# Patient Record
Sex: MALE | Race: WHITE | Employment: OTHER | URBAN - METROPOLITAN AREA
[De-identification: names, ages, dates, MRNs, and addresses within clinical notes are randomized per-mention and may not be internally consistent; named-entity substitution may affect disease eponyms.]

---

## 2022-11-08 ENCOUNTER — OFFICE VISIT (OUTPATIENT)
Dept: GASTROENTEROLOGY | Age: 47
End: 2022-11-08
Payer: MEDICARE

## 2022-11-08 VITALS
DIASTOLIC BLOOD PRESSURE: 80 MMHG | OXYGEN SATURATION: 97 % | SYSTOLIC BLOOD PRESSURE: 120 MMHG | WEIGHT: 287.4 LBS | TEMPERATURE: 98.3 F | HEART RATE: 70 BPM | BODY MASS INDEX: 38.09 KG/M2 | RESPIRATION RATE: 14 BRPM | HEIGHT: 73 IN

## 2022-11-08 DIAGNOSIS — R11.2 NAUSEA AND VOMITING, UNSPECIFIED VOMITING TYPE: ICD-10-CM

## 2022-11-08 DIAGNOSIS — R10.13 EPIGASTRIC PAIN: Primary | ICD-10-CM

## 2022-11-08 DIAGNOSIS — Z98.84 STATUS POST BARIATRIC SURGERY: ICD-10-CM

## 2022-11-08 DIAGNOSIS — Z12.11 COLON CANCER SCREENING: ICD-10-CM

## 2022-11-08 PROBLEM — I10 HTN (HYPERTENSION): Status: ACTIVE | Noted: 2022-11-08

## 2022-11-08 PROBLEM — R11.10 VOMITING: Status: ACTIVE | Noted: 2022-11-08

## 2022-11-08 PROBLEM — R10.9 ABDOMINAL PAIN: Status: ACTIVE | Noted: 2022-11-08

## 2022-11-08 PROCEDURE — G8510 SCR DEP NEG, NO PLAN REQD: HCPCS | Performed by: INTERNAL MEDICINE

## 2022-11-08 PROCEDURE — G8417 CALC BMI ABV UP PARAM F/U: HCPCS | Performed by: INTERNAL MEDICINE

## 2022-11-08 PROCEDURE — 3079F DIAST BP 80-89 MM HG: CPT | Performed by: INTERNAL MEDICINE

## 2022-11-08 PROCEDURE — 3074F SYST BP LT 130 MM HG: CPT | Performed by: INTERNAL MEDICINE

## 2022-11-08 PROCEDURE — 99204 OFFICE O/P NEW MOD 45 MIN: CPT | Performed by: INTERNAL MEDICINE

## 2022-11-08 PROCEDURE — G8427 DOCREV CUR MEDS BY ELIG CLIN: HCPCS | Performed by: INTERNAL MEDICINE

## 2022-11-08 RX ORDER — ERGOCALCIFEROL 1.25 MG/1
CAPSULE ORAL
COMMUNITY
Start: 2022-10-20

## 2022-11-08 RX ORDER — ROSUVASTATIN CALCIUM 10 MG/1
TABLET, COATED ORAL
COMMUNITY
Start: 2022-03-28

## 2022-11-08 RX ORDER — PANTOPRAZOLE SODIUM 40 MG/1
TABLET, DELAYED RELEASE ORAL
COMMUNITY
Start: 2022-09-26

## 2022-11-08 RX ORDER — OXYCODONE HYDROCHLORIDE 10 MG/1
TABLET ORAL
COMMUNITY
Start: 2022-10-17

## 2022-11-08 RX ORDER — SERTRALINE HYDROCHLORIDE 100 MG/1
TABLET, FILM COATED ORAL
COMMUNITY
Start: 2022-03-14

## 2022-11-08 RX ORDER — POLYETHYLENE GLYCOL 3350 17 G/17G
POWDER, FOR SOLUTION ORAL
Qty: 510 G | Refills: 0 | Status: SHIPPED | OUTPATIENT
Start: 2022-11-08 | End: 2022-11-30

## 2022-11-08 RX ORDER — BUDESONIDE AND FORMOTEROL FUMARATE DIHYDRATE 160; 4.5 UG/1; UG/1
AEROSOL RESPIRATORY (INHALATION)
COMMUNITY

## 2022-11-08 RX ORDER — BUPROPION HYDROCHLORIDE 150 MG/1
TABLET ORAL
COMMUNITY
Start: 2022-03-17

## 2022-11-08 RX ORDER — IBUPROFEN 600 MG/1
TABLET ORAL
COMMUNITY
Start: 2022-09-25

## 2022-11-08 RX ORDER — LOSARTAN POTASSIUM 100 MG/1
TABLET ORAL
COMMUNITY
Start: 2022-10-20

## 2022-11-08 RX ORDER — BUSPIRONE HYDROCHLORIDE 10 MG/1
TABLET ORAL
COMMUNITY
Start: 2022-10-20 | End: 2022-11-30

## 2022-11-08 RX ORDER — POTASSIUM CHLORIDE 750 MG/1
10 CAPSULE, EXTENDED RELEASE ORAL 2 TIMES DAILY
COMMUNITY
Start: 2022-10-20

## 2022-11-08 RX ORDER — ALBUTEROL SULFATE 90 UG/1
AEROSOL, METERED RESPIRATORY (INHALATION)
COMMUNITY

## 2022-11-08 RX ORDER — SPIRONOLACTONE 25 MG/1
25 TABLET ORAL DAILY
COMMUNITY
Start: 2022-10-20

## 2022-11-08 RX ORDER — NALOXONE HYDROCHLORIDE 4 MG/.1ML
SPRAY NASAL
COMMUNITY
Start: 2022-06-22

## 2022-11-08 RX ORDER — LUBIPROSTONE 24 UG/1
24 CAPSULE ORAL
COMMUNITY

## 2022-11-08 RX ORDER — METOPROLOL TARTRATE 25 MG/1
TABLET, FILM COATED ORAL
COMMUNITY
Start: 2022-09-25

## 2022-11-08 NOTE — H&P (VIEW-ONLY)
Shmuel Hassan is a 48 y.o. male who presents today for the following:  Chief Complaint   Patient presents with    Abdominal Pain    Vomiting     Hx of gastric bypass         Allergies   Allergen Reactions    Gabapentin Other (comments)     Other reaction(s): SEVERE STOMACH PAINS  Other reaction(s): SEVERE STOMACH PAINS      Morphine Other (comments)     Other reaction(s): PT STATES, \"MAKES ME GO CRAZY\"       Current Outpatient Medications   Medication Sig    busPIRone (BUSPAR) 10 mg tablet TAKE ONE TABLET BY MOUTH THREE TIMES DAILY AS NEEDED    Vitamin D2 1,250 mcg (50,000 unit) capsule TAKE ONE CAPSULE BY MOUTH EVERY WEEK    ibuprofen (MOTRIN) 600 mg tablet TAKE ONE TABLET THREE TIMES DAILY AS NEEDED WITH FOOD    losartan (COZAAR) 100 mg tablet take 1/2 tablet BY MOUTH TWICE DAILY    metoprolol tartrate (LOPRESSOR) 25 mg tablet take 1/2 tablet BY MOUTH TWICE DAILY    oxyCODONE IR (ROXICODONE) 10 mg tab immediate release tablet TAKE ONE TABLET BY MOUTH EVERY 6 HOURS    potassium chloride SA (MICRO-K) 10 mEq capsule Take 10 mEq by mouth two (2) times a day. spironolactone (ALDACTONE) 25 mg tablet Take 25 mg by mouth daily. albuterol (PROVENTIL HFA, VENTOLIN HFA, PROAIR HFA) 90 mcg/actuation inhaler ProAir HFA 90 mcg/actuation aerosol inhaler   Inhale 2 puffs every day by inhalation route as needed for 30 days. budesonide-formoteroL (SYMBICORT) 160-4.5 mcg/actuation HFAA Symbicort 160 mcg-4.5 mcg/actuation HFA aerosol inhaler    buPROPion XL (WELLBUTRIN XL) 150 mg tablet bupropion HCl  mg 24 hr tablet, extended release    naloxone (NARCAN) 4 mg/actuation nasal spray Narcan 4 mg/actuation nasal spray   USE AS NEEDED FOR OPIOID EMERGENCY    lubiPROStone (Amitiza) 24 mcg capsule Take 24 mcg by mouth.     rosuvastatin (CRESTOR) 10 mg tablet rosuvastatin 10 mg tablet    pantoprazole (PROTONIX) 40 mg tablet pantoprazole 40 mg tablet,delayed release    sertraline (ZOLOFT) 100 mg tablet sertraline 100 mg tablet   Take 1 tablet every day by oral route. polyethylene glycol (MIRALAX) 17 gram/dose powder Use as directed  Indications: emptying of the bowel     No current facility-administered medications for this visit. Past Medical History:   Diagnosis Date    Abdominal pain 11/8/2022    HTN (hypertension) 11/8/2022    Vomiting 11/8/2022    Hx of Gastric Bypass in 2011       Past Surgical History:   Procedure Laterality Date    HX GASTRIC BYPASS  2008    [facility] DR Sammi Kirby 57    HX ORTHOPAEDIC Left     has had 9 knee surgeries, 3 were knee replacements. HX ORTHOPAEDIC Left     carpal tunnel surgery       Family History   Problem Relation Age of Onset    Diabetes Father     Prostate Cancer Father     Heart Disease Other     Hypertension Other        Social History     Socioeconomic History    Marital status:      Spouse name: Not on file    Number of children: Not on file    Years of education: Not on file    Highest education level: Not on file   Occupational History    Not on file   Tobacco Use    Smoking status: Never    Smokeless tobacco: Never   Vaping Use    Vaping Use: Never used   Substance and Sexual Activity    Alcohol use: Never    Drug use: Never    Sexual activity: Not on file   Other Topics Concern    Not on file   Social History Narrative    Not on file     Social Determinants of Health     Financial Resource Strain: Not on file   Food Insecurity: Not on file   Transportation Needs: Not on file   Physical Activity: Not on file   Stress: Not on file   Social Connections: Not on file   Intimate Partner Violence: Not on file   Housing Stability: Not on file         57-year-old male with history of hypertension, hyperlipidemia, obesity, status post gastric bypass surgery, and gastroesophageal reflux disease who comes in for abdominal pain.   States he had gastric bypass surgery in April 2008 he states he has pain in the epigastric region every day and is been present for years. He has a lot of nausea and vomiting. States he tolerates snacks well but not regular food. He gets full few bites of a meal.  He states his bowel movements are regular and formed. He does take pantoprazole and Tums for the symptoms. He did have an EGD as ago which was okay. That test was done in [place]. He states he tolerates liquids well. Vomiting   Associated symptoms include abdominal pain. Pertinent negatives include no diarrhea. Review of Systems   Constitutional: Negative. HENT: Negative. Negative for nosebleeds. Eyes: Negative. Respiratory: Negative. Cardiovascular: Negative. Gastrointestinal:  Positive for abdominal pain, heartburn, nausea and vomiting. Negative for blood in stool, constipation, diarrhea and melena. Genitourinary: Negative. Musculoskeletal:  Positive for joint pain. Skin: Negative. Neurological: Negative. Endo/Heme/Allergies: Negative. Psychiatric/Behavioral: Negative. All other systems reviewed and are negative. Visit Vitals  /80 (BP 1 Location: Left upper arm, BP Patient Position: Sitting, BP Cuff Size: Large adult)   Pulse 70   Temp 98.3 °F (36.8 °C) (Temporal)   Resp 14   Ht 6' 1\" (1.854 m)   Wt 130.4 kg (287 lb 6.4 oz)   SpO2 97% Comment: room air   BMI 37.92 kg/m²     Physical Exam  Vitals and nursing note reviewed. Constitutional:       Appearance: Normal appearance. He is obese. HENT:      Head: Normocephalic and atraumatic. Nose: Nose normal.      Mouth/Throat:      Mouth: Mucous membranes are moist.      Pharynx: Oropharynx is clear. Eyes:      General: No scleral icterus. Extraocular Movements: Extraocular movements intact. Conjunctiva/sclera: Conjunctivae normal.      Pupils: Pupils are equal, round, and reactive to light. Cardiovascular:      Rate and Rhythm: Normal rate and regular rhythm. Heart sounds: Normal heart sounds.    Pulmonary:      Effort: Pulmonary effort is normal.      Breath sounds: Normal breath sounds. Abdominal:      General: Bowel sounds are normal. There is no distension. Palpations: Abdomen is soft. There is no mass. Tenderness: There is abdominal tenderness. There is guarding. There is no right CVA tenderness, left CVA tenderness or rebound. Hernia: No hernia is present. Musculoskeletal:         General: Normal range of motion. Cervical back: Normal range of motion and neck supple. Skin:     General: Skin is warm and dry. Coloration: Skin is not jaundiced. Neurological:      General: No focal deficit present. Mental Status: He is alert and oriented to person, place, and time. Psychiatric:         Mood and Affect: Mood normal.         Behavior: Behavior normal.         Thought Content: Thought content normal.         Judgment: Judgment normal.          1. Epigastric pain  Possibly secondary to peptic ulcer disease versus gastritis  - UPPER GI ENDOSCOPY,DIAGNOSIS; Future    2. Nausea and vomiting, unspecified vomiting type  Consider possibility of a stricture at the anastomosis  - UPPER GI ENDOSCOPY,DIAGNOSIS; Future    3. Colon cancer screening  We will schedule patient for a screening colonoscopy  - COLONOSCOPY,DIAGNOSTIC; Future  - polyethylene glycol (MIRALAX) 17 gram/dose powder; Use as directed  Indications: emptying of the bowel  Dispense: 510 g; Refill: 0    4.  Status post bariatric surgery

## 2022-11-08 NOTE — PROGRESS NOTES
Jess Epps is a 48 y.o. male who presents today for the following:  Chief Complaint   Patient presents with    Abdominal Pain    Vomiting     Hx of gastric bypass         Allergies   Allergen Reactions    Gabapentin Other (comments)     Other reaction(s): SEVERE STOMACH PAINS  Other reaction(s): SEVERE STOMACH PAINS      Morphine Other (comments)     Other reaction(s): PT STATES, \"MAKES ME GO CRAZY\"       Current Outpatient Medications   Medication Sig    busPIRone (BUSPAR) 10 mg tablet TAKE ONE TABLET BY MOUTH THREE TIMES DAILY AS NEEDED    Vitamin D2 1,250 mcg (50,000 unit) capsule TAKE ONE CAPSULE BY MOUTH EVERY WEEK    ibuprofen (MOTRIN) 600 mg tablet TAKE ONE TABLET THREE TIMES DAILY AS NEEDED WITH FOOD    losartan (COZAAR) 100 mg tablet take 1/2 tablet BY MOUTH TWICE DAILY    metoprolol tartrate (LOPRESSOR) 25 mg tablet take 1/2 tablet BY MOUTH TWICE DAILY    oxyCODONE IR (ROXICODONE) 10 mg tab immediate release tablet TAKE ONE TABLET BY MOUTH EVERY 6 HOURS    potassium chloride SA (MICRO-K) 10 mEq capsule Take 10 mEq by mouth two (2) times a day. spironolactone (ALDACTONE) 25 mg tablet Take 25 mg by mouth daily. albuterol (PROVENTIL HFA, VENTOLIN HFA, PROAIR HFA) 90 mcg/actuation inhaler ProAir HFA 90 mcg/actuation aerosol inhaler   Inhale 2 puffs every day by inhalation route as needed for 30 days. budesonide-formoteroL (SYMBICORT) 160-4.5 mcg/actuation HFAA Symbicort 160 mcg-4.5 mcg/actuation HFA aerosol inhaler    buPROPion XL (WELLBUTRIN XL) 150 mg tablet bupropion HCl  mg 24 hr tablet, extended release    naloxone (NARCAN) 4 mg/actuation nasal spray Narcan 4 mg/actuation nasal spray   USE AS NEEDED FOR OPIOID EMERGENCY    lubiPROStone (Amitiza) 24 mcg capsule Take 24 mcg by mouth.     rosuvastatin (CRESTOR) 10 mg tablet rosuvastatin 10 mg tablet    pantoprazole (PROTONIX) 40 mg tablet pantoprazole 40 mg tablet,delayed release    sertraline (ZOLOFT) 100 mg tablet sertraline 100 mg tablet   Take 1 tablet every day by oral route. polyethylene glycol (MIRALAX) 17 gram/dose powder Use as directed  Indications: emptying of the bowel     No current facility-administered medications for this visit. Past Medical History:   Diagnosis Date    Abdominal pain 11/8/2022    HTN (hypertension) 11/8/2022    Vomiting 11/8/2022    Hx of Gastric Bypass in 2011       Past Surgical History:   Procedure Laterality Date    HX GASTRIC BYPASS  2008    [facility] DR Sammi Kirby 57    HX ORTHOPAEDIC Left     has had 9 knee surgeries, 3 were knee replacements. HX ORTHOPAEDIC Left     carpal tunnel surgery       Family History   Problem Relation Age of Onset    Diabetes Father     Prostate Cancer Father     Heart Disease Other     Hypertension Other        Social History     Socioeconomic History    Marital status:      Spouse name: Not on file    Number of children: Not on file    Years of education: Not on file    Highest education level: Not on file   Occupational History    Not on file   Tobacco Use    Smoking status: Never    Smokeless tobacco: Never   Vaping Use    Vaping Use: Never used   Substance and Sexual Activity    Alcohol use: Never    Drug use: Never    Sexual activity: Not on file   Other Topics Concern    Not on file   Social History Narrative    Not on file     Social Determinants of Health     Financial Resource Strain: Not on file   Food Insecurity: Not on file   Transportation Needs: Not on file   Physical Activity: Not on file   Stress: Not on file   Social Connections: Not on file   Intimate Partner Violence: Not on file   Housing Stability: Not on file         25-year-old male with history of hypertension, hyperlipidemia, obesity, status post gastric bypass surgery, and gastroesophageal reflux disease who comes in for abdominal pain.   States he had gastric bypass surgery in April 2008 he states he has pain in the epigastric region every day and is been present for years. He has a lot of nausea and vomiting. States he tolerates snacks well but not regular food. He gets full few bites of a meal.  He states his bowel movements are regular and formed. He does take pantoprazole and Tums for the symptoms. He did have an EGD as ago which was okay. That test was done in [place]. He states he tolerates liquids well. Vomiting   Associated symptoms include abdominal pain. Pertinent negatives include no diarrhea. Review of Systems   Constitutional: Negative. HENT: Negative. Negative for nosebleeds. Eyes: Negative. Respiratory: Negative. Cardiovascular: Negative. Gastrointestinal:  Positive for abdominal pain, heartburn, nausea and vomiting. Negative for blood in stool, constipation, diarrhea and melena. Genitourinary: Negative. Musculoskeletal:  Positive for joint pain. Skin: Negative. Neurological: Negative. Endo/Heme/Allergies: Negative. Psychiatric/Behavioral: Negative. All other systems reviewed and are negative. Visit Vitals  /80 (BP 1 Location: Left upper arm, BP Patient Position: Sitting, BP Cuff Size: Large adult)   Pulse 70   Temp 98.3 °F (36.8 °C) (Temporal)   Resp 14   Ht 6' 1\" (1.854 m)   Wt 130.4 kg (287 lb 6.4 oz)   SpO2 97% Comment: room air   BMI 37.92 kg/m²     Physical Exam  Vitals and nursing note reviewed. Constitutional:       Appearance: Normal appearance. He is obese. HENT:      Head: Normocephalic and atraumatic. Nose: Nose normal.      Mouth/Throat:      Mouth: Mucous membranes are moist.      Pharynx: Oropharynx is clear. Eyes:      General: No scleral icterus. Extraocular Movements: Extraocular movements intact. Conjunctiva/sclera: Conjunctivae normal.      Pupils: Pupils are equal, round, and reactive to light. Cardiovascular:      Rate and Rhythm: Normal rate and regular rhythm. Heart sounds: Normal heart sounds.    Pulmonary:      Effort: Pulmonary effort is normal.      Breath sounds: Normal breath sounds. Abdominal:      General: Bowel sounds are normal. There is no distension. Palpations: Abdomen is soft. There is no mass. Tenderness: There is abdominal tenderness. There is guarding. There is no right CVA tenderness, left CVA tenderness or rebound. Hernia: No hernia is present. Musculoskeletal:         General: Normal range of motion. Cervical back: Normal range of motion and neck supple. Skin:     General: Skin is warm and dry. Coloration: Skin is not jaundiced. Neurological:      General: No focal deficit present. Mental Status: He is alert and oriented to person, place, and time. Psychiatric:         Mood and Affect: Mood normal.         Behavior: Behavior normal.         Thought Content: Thought content normal.         Judgment: Judgment normal.          1. Epigastric pain  Possibly secondary to peptic ulcer disease versus gastritis  - UPPER GI ENDOSCOPY,DIAGNOSIS; Future    2. Nausea and vomiting, unspecified vomiting type  Consider possibility of a stricture at the anastomosis  - UPPER GI ENDOSCOPY,DIAGNOSIS; Future    3. Colon cancer screening  We will schedule patient for a screening colonoscopy  - COLONOSCOPY,DIAGNOSTIC; Future  - polyethylene glycol (MIRALAX) 17 gram/dose powder; Use as directed  Indications: emptying of the bowel  Dispense: 510 g; Refill: 0    4.  Status post bariatric surgery

## 2022-11-08 NOTE — PROGRESS NOTES
1. Have you been to the ER, urgent care clinic since your last visit? Hospitalized since your last visit? no    2. Have you seen or consulted any other health care providers outside of the [address] [place] [address] since your last visit? Include any pap smears or colon screening.   No   Chief Complaint   Patient presents with    Abdominal Pain    Vomiting     Hx of gastric bypass     Visit Vitals  /80 (BP 1 Location: Left upper arm, BP Patient Position: Sitting, BP Cuff Size: Large adult)   Pulse 70   Temp 98.3 °F (36.8 °C) (Temporal)   Resp 14   Ht 6' 1\" (1.854 m)   Wt 130.4 kg (287 lb 6.4 oz)   SpO2 97% Comment: room air   BMI 37.92 kg/m²

## 2022-11-30 ENCOUNTER — ANESTHESIA EVENT (OUTPATIENT)
Dept: ENDOSCOPY | Age: 47
End: 2022-11-30
Payer: MEDICARE

## 2022-11-30 ENCOUNTER — ANESTHESIA (OUTPATIENT)
Dept: ENDOSCOPY | Age: 47
End: 2022-11-30
Payer: MEDICARE

## 2022-11-30 ENCOUNTER — HOSPITAL ENCOUNTER (OUTPATIENT)
Age: 47
Setting detail: OUTPATIENT SURGERY
Discharge: HOME | End: 2022-11-30
Attending: INTERNAL MEDICINE | Admitting: INTERNAL MEDICINE
Payer: MEDICARE

## 2022-11-30 VITALS
TEMPERATURE: 97.8 F | BODY MASS INDEX: 37.24 KG/M2 | HEART RATE: 50 BPM | RESPIRATION RATE: 18 BRPM | OXYGEN SATURATION: 94 % | HEIGHT: 73 IN | SYSTOLIC BLOOD PRESSURE: 115 MMHG | WEIGHT: 281 LBS | DIASTOLIC BLOOD PRESSURE: 57 MMHG

## 2022-11-30 PROCEDURE — 74011250636 HC RX REV CODE- 250/636: Performed by: NURSE ANESTHETIST, CERTIFIED REGISTERED

## 2022-11-30 PROCEDURE — 77030021593 HC FCPS BIOP ENDOSC BSC -A: Performed by: INTERNAL MEDICINE

## 2022-11-30 PROCEDURE — 74011000250 HC RX REV CODE- 250: Performed by: NURSE ANESTHETIST, CERTIFIED REGISTERED

## 2022-11-30 PROCEDURE — 2709999900 HC NON-CHARGEABLE SUPPLY: Performed by: INTERNAL MEDICINE

## 2022-11-30 PROCEDURE — 74011250636 HC RX REV CODE- 250/636

## 2022-11-30 PROCEDURE — 74011000250 HC RX REV CODE- 250

## 2022-11-30 PROCEDURE — 74011250636 HC RX REV CODE- 250/636: Performed by: INTERNAL MEDICINE

## 2022-11-30 PROCEDURE — 76060000031 HC ANESTHESIA FIRST 0.5 HR: Performed by: INTERNAL MEDICINE

## 2022-11-30 PROCEDURE — 88305 TISSUE EXAM BY PATHOLOGIST: CPT

## 2022-11-30 PROCEDURE — 76040000019: Performed by: INTERNAL MEDICINE

## 2022-11-30 RX ORDER — SODIUM CHLORIDE 0.9 % (FLUSH) 0.9 %
5-40 SYRINGE (ML) INJECTION AS NEEDED
Status: DISCONTINUED | OUTPATIENT
Start: 2022-11-30 | End: 2022-11-30 | Stop reason: HOSPADM

## 2022-11-30 RX ORDER — LIDOCAINE HYDROCHLORIDE 20 MG/ML
INJECTION, SOLUTION EPIDURAL; INFILTRATION; INTRACAUDAL; PERINEURAL AS NEEDED
Status: DISCONTINUED | OUTPATIENT
Start: 2022-11-30 | End: 2022-11-30 | Stop reason: HOSPADM

## 2022-11-30 RX ORDER — MIDAZOLAM HYDROCHLORIDE 1 MG/ML
INJECTION, SOLUTION INTRAMUSCULAR; INTRAVENOUS AS NEEDED
Status: DISCONTINUED | OUTPATIENT
Start: 2022-11-30 | End: 2022-11-30 | Stop reason: HOSPADM

## 2022-11-30 RX ORDER — SODIUM CHLORIDE 0.9 % (FLUSH) 0.9 %
5-40 SYRINGE (ML) INJECTION EVERY 8 HOURS
Status: DISCONTINUED | OUTPATIENT
Start: 2022-11-30 | End: 2022-11-30 | Stop reason: HOSPADM

## 2022-11-30 RX ORDER — SODIUM CHLORIDE 9 MG/ML
125 INJECTION, SOLUTION INTRAVENOUS CONTINUOUS
Status: DISCONTINUED | OUTPATIENT
Start: 2022-11-30 | End: 2022-11-30 | Stop reason: HOSPADM

## 2022-11-30 RX ORDER — FENTANYL CITRATE 50 UG/ML
INJECTION, SOLUTION INTRAMUSCULAR; INTRAVENOUS AS NEEDED
Status: DISCONTINUED | OUTPATIENT
Start: 2022-11-30 | End: 2022-11-30 | Stop reason: HOSPADM

## 2022-11-30 RX ORDER — PROPOFOL 10 MG/ML
INJECTION, EMULSION INTRAVENOUS AS NEEDED
Status: DISCONTINUED | OUTPATIENT
Start: 2022-11-30 | End: 2022-11-30 | Stop reason: HOSPADM

## 2022-11-30 RX ADMIN — PROPOFOL 50 MG: 10 INJECTION, EMULSION INTRAVENOUS at 10:43

## 2022-11-30 RX ADMIN — PROPOFOL 30 MG: 10 INJECTION, EMULSION INTRAVENOUS at 10:49

## 2022-11-30 RX ADMIN — FENTANYL CITRATE 50 MCG: 50 INJECTION, SOLUTION INTRAMUSCULAR; INTRAVENOUS at 10:51

## 2022-11-30 RX ADMIN — FENTANYL CITRATE 50 MCG: 50 INJECTION, SOLUTION INTRAMUSCULAR; INTRAVENOUS at 10:43

## 2022-11-30 RX ADMIN — PROPOFOL 30 MG: 10 INJECTION, EMULSION INTRAVENOUS at 10:53

## 2022-11-30 RX ADMIN — SODIUM CHLORIDE 125 ML/HR: 9 INJECTION, SOLUTION INTRAVENOUS at 09:18

## 2022-11-30 RX ADMIN — LIDOCAINE HYDROCHLORIDE 100 MG: 20 INJECTION, SOLUTION EPIDURAL; INFILTRATION; INTRACAUDAL; PERINEURAL at 10:43

## 2022-11-30 RX ADMIN — MIDAZOLAM 2 MG: 1 INJECTION INTRAMUSCULAR; INTRAVENOUS at 10:40

## 2022-11-30 RX ADMIN — PROPOFOL 30 MG: 10 INJECTION, EMULSION INTRAVENOUS at 11:00

## 2022-11-30 RX ADMIN — PROPOFOL 30 MG: 10 INJECTION, EMULSION INTRAVENOUS at 10:46

## 2022-11-30 RX ADMIN — SODIUM CHLORIDE: 9 INJECTION, SOLUTION INTRAVENOUS at 10:34

## 2022-11-30 RX ADMIN — PROPOFOL 30 MG: 10 INJECTION, EMULSION INTRAVENOUS at 10:57

## 2022-11-30 NOTE — ANESTHESIA PREPROCEDURE EVALUATION
Relevant Problems   CARDIOVASCULAR   (+) HTN (hypertension)       Anesthesia Evaluation    Physical Exam    Airway  Mallampati: III  TM Distance: 4 - 6 cm  Neck ROM: normal range of motion   Mouth opening: Normal     Cardiovascular               Dental  No notable dental hx       Pulmonary                 Abdominal         Other Findings            Anesthetic Plan    ASA: 2  Anesthesia type: MAC and total IV anesthesia            Anesthetic plan and risks discussed with: Patient

## 2022-11-30 NOTE — DISCHARGE INSTRUCTIONS

## 2022-11-30 NOTE — ANESTHESIA POSTPROCEDURE EVALUATION
Procedure(s):  COLONOSCOPY  ESOPHAGOGASTRODUODENOSCOPY (EGD). MAC, total IV anesthesia    Anesthesia Post Evaluation      Multimodal analgesia: multimodal analgesia used between 6 hours prior to anesthesia start to PACU discharge  Patient location during evaluation: PACU  Patient participation: complete - patient participated  Level of consciousness: sleepy but conscious  Pain score: 0  Pain management: adequate  Airway patency: patent  Anesthetic complications: no  Cardiovascular status: acceptable  Respiratory status: room air  Hydration status: acceptable  Post anesthesia nausea and vomiting:  none  Final Post Anesthesia Temperature Assessment:  Normothermia (36.0-37.5 degrees C)      INITIAL Post-op Vital signs: No vitals data found for the desired time range.

## 2022-11-30 NOTE — OP NOTES
EGD Procedure Note        Patient: Gianfranco Dewitt MRN: [id number]  SSN: [id number]    YOB: 1975  Age: 48 y.o. Sex: male        Date/Time:  11/30/2022 11:11 AM         IMPRESSION:       Gastritis (anastomosis)  Status post gastric bypass surgery-Jannet-en-Y  Distal esophagitis (grade 2)       RECOMMENDATIONS:    Check biopsy results. Continue the pantoprazole 40 mg daily    Procedure: Esophagogastroduodenoscopy with cold biopsies    Indication: Epigastric abdominal pain, nausea and vomiting, status post gastric bypass surgery    Endoscopist:  Kady Robertson MD    Referring Provider:   Walt Mazariegos MD    History: The history and physical exam were reviewed and updated. Endoscope: GIF H190 Olympus video endoscope    Extent of Exam: mid-jejunum    ASA: ASA 2 - Patient with mild systemic disease with no functional limitations    Anethesia/Sedation:  TIVA    Description of the procedure: The procedure was discussed with the patient including risks, benefits, alternatives including risks of iv sedation, bleeding, perforation and aspiration. A safety timeout was performed. The patient was placed in the left lateral decubitus position. A bite block was placed. The patient was using standard protocol. The patients vital signs were monitored at all times including heart rate/rhythm, blood pressure and oxygen saturation. The endoscope was then passed under direct visualization to the mid jejunum. The endoscope was then slowly withdrawn while visualizing the mucosa. In the stomach a retroflexion was performed and gastric fundus and cardia visualized. The patient was then transferred to recovery in stable condition. Findings:   Esophagus: The esophageal mucosa was inflamed in the distal esophagus with irregularity of the Z-line. .  Stomach: Patient had a small gastric pouch with increased erythema near the anastomosis from his previous gastric bypass surgery.   No ulcers were noted, multiple biopsies were taken at the anastomotic region. .   Jejunum: The scope was taken down the surgery-Jannet-en-Y loop for approximately 40 cm. No mucosal lesions were noted in  that region. .     Therapies:  None    Specimens:   ID Type Source Tests Collected by Time Destination   1 : Gastric Mucosa Anatomosis Preservative Stomach  Katie Gomez MD 11/30/2022 1047 Pathology        Assistants:   Circ-1: Shari Klinefelter  Scrub RN-1: Karo Redmond RN    EBL:Minimal    Complications:   None; patient tolerated the procedure well.      Implants: None    Discharge disposition:  Out of the recovery area when discharge criteria met         Iline Ormond, MD  November 30, 2022  11:11 AM

## 2022-11-30 NOTE — PROGRESS NOTES
Patient turned on bathroom call light, Patient found sweating and light headed. Sitting on commode. Return to bed via wheelchair,. VS bp 117 65,pr 134-20. O2 stat 93%. Complaint of feeling hot. HAFAS Garcia was in . Patient passing  gas. Still had some Gas pain . Patient was allow to take pain from home, which was percocet 5mg.see Q % min vs in computer.

## 2022-11-30 NOTE — OP NOTES
Colonoscopy Procedure Note      Patient: Eva Grimes MRN: [id number]  SSN: [id number]    YOB: 1975  Age: 48 y.o. Sex: male      Date of Procedure: 11/30/2022  Date/Time:  11/30/2022 11:06 AM       IMPRESSION:     1. Sigmoid diverticulosis   2. Internal hemorrhoidal tags         RECOMMENDATIONS:     1) Increase fiber in diet to keep stools soft. 2) Repeat colonoscopy in 10 years. INDICATION: Colon screening    PROCEDURE PERFORMED: Colonoscopy      DESCRIPTION OF PROCEDURE: An informed consent was obtained. The patient was placed in left lateral position. Perianal inspection and a digital rectal exam was performed. Video colonoscope was introduced into the rectum and advanced under direct vision up to the terminal ileum. With adequate insufflation and maneuvering of the withdrawing scope, the colonic mucosa was visualized carefully. Retroflexion was performed in the rectum to see the anorectum and also in the ascending colon to look behind the folds. Vital signs, pulse oximetry, single lead cardiac monitor were monitored throughout the procedure as the sedation was titrated to the desired effect ensuring patient comfort and safety. The patient tolerated the procedure very well and was transferred to the recovery area. Following is the summary of findings: Diverticulosis was noted throughout the sigmoid colon. They were generally small and moderate in number.   Patient also has some hemorrhoids tags and few hemorrhoids which were internal.        ENDOSCOPIST: Tosha Goddard MD     ENDOSCOPE: Olympus video colonoscope     ASSISTANT:Circ-1: Saad Antonio RN-1: Henrik Galan RN     ANESTHESIA: TIVA      QUALITY OF PREPARATION:fair      FINDINGS:   Sigmoid diverticulosis  Internal hemorrhoidal tags        Complication:  None         EBL: None     SPECIMENS:   ID Type Source Tests Collected by Time Destination   1 : Gastric Mucosa Anatomosis Preservative Stomach  Kelsey Solis MD 11/30/2022 1047 Pathology             Jenny Bose MD  November 30, 2022  11:06 AM

## 2022-11-30 NOTE — INTERVAL H&P NOTE
Update History & Physical    The Patient's History and Physical of November 30, 2022 was reviewed with the patient and I examined the patient. There was no change. The surgical site was confirmed by the patient and me. Plan:  The risk, benefits, expected outcome, and alternative to the recommended procedure have been discussed with the patient. Patient understands and wants to proceed with the procedure.     Electronically signed by Sakina Rob MD on 11/30/2022 at 9:46 AM

## 2022-12-19 NOTE — PROGRESS NOTES
Send that the biopsies taken at the anastomosis in his stomach showed gastritis/inflammation. No infection was noted. Continue the pantoprazole 40 mg daily. He should have a repeat colonoscopy in 10 years.

## (undated) DEVICE — PAD,PREPPING,CUFFED,24X48,7",NONSTERILE: Brand: MEDLINE

## (undated) DEVICE — GLOVE ORANGE PI 7 1/2   MSG9075

## (undated) DEVICE — WASH CLOTH INCONT LO LINT PREM 12X13 IN LF DISP

## (undated) DEVICE — MASK POM PROCEDURE OXY W/ HI CONCENTRATION CO2 MONITOR

## (undated) DEVICE — FORCEPS BX L240CM WRK CHN 2.8MM STD CAP W/ NDL MIC MESH

## (undated) DEVICE — SPONGE GZ W4XL4IN COT 12 PLY TYP VII WVN C FLD DSGN

## (undated) DEVICE — JELLY,LUBE,STERILE,FLIP TOP,TUBE,4-OZ: Brand: MEDLINE

## (undated) DEVICE — 1200CC GUARDIAN II: Brand: GUARDIAN

## (undated) DEVICE — SOLUTION IRRIG 1000ML STRL H2O USP PLAS POUR BTL

## (undated) DEVICE — TUBING, SUCTION, 9/32" X 10', STRAIGHT: Brand: MEDLINE